# Patient Record
Sex: FEMALE | Race: WHITE | NOT HISPANIC OR LATINO | Employment: FULL TIME | URBAN - METROPOLITAN AREA
[De-identification: names, ages, dates, MRNs, and addresses within clinical notes are randomized per-mention and may not be internally consistent; named-entity substitution may affect disease eponyms.]

---

## 2023-03-02 LAB
ABO GROUP (TYPE) IN BLOOD: NORMAL
ANTIBODY SCREEN: NORMAL
CHLAMYDIA TRACH., AMPLIFIED: NEGATIVE
ERYTHROCYTE DISTRIBUTION WIDTH (RATIO) BY AUTOMATED COUNT: 16.6 % (ref 11.5–14.5)
ERYTHROCYTE MEAN CORPUSCULAR HEMOGLOBIN CONCENTRATION (G/DL) BY AUTOMATED: 29.5 G/DL (ref 32–36)
ERYTHROCYTE MEAN CORPUSCULAR VOLUME (FL) BY AUTOMATED COUNT: 91 FL (ref 80–100)
ERYTHROCYTES (10*6/UL) IN BLOOD BY AUTOMATED COUNT: 4.26 X10E12/L (ref 4–5.2)
ESTIMATED AVERAGE GLUCOSE FOR HBA1C: 94 MG/DL
HEMATOCRIT (%) IN BLOOD BY AUTOMATED COUNT: 38.7 % (ref 36–46)
HEMOGLOBIN (G/DL) IN BLOOD: 11.4 G/DL (ref 12–16)
HEMOGLOBIN A1C/HEMOGLOBIN TOTAL IN BLOOD: 4.9 %
HEPATITIS B VIRUS SURFACE AG PRESENCE IN SERUM: NONREACTIVE
HIV 1/ 2 AG/AB SCREEN: NONREACTIVE
LEUKOCYTES (10*3/UL) IN BLOOD BY AUTOMATED COUNT: 7 X10E9/L (ref 4.4–11.3)
N. GONORRHEA, AMPLIFIED: NEGATIVE
NRBC (PER 100 WBCS) BY AUTOMATED COUNT: 0 /100 WBC (ref 0–0)
PLATELETS (10*3/UL) IN BLOOD AUTOMATED COUNT: 237 X10E9/L (ref 150–450)
REFLEX ADDED, ANEMIA PANEL: ABNORMAL
RH FACTOR: NORMAL
RUBELLA VIRUS IGG AB: POSITIVE
SYPHILIS TOTAL AB: NONREACTIVE
URINE CULTURE: NORMAL

## 2023-06-19 LAB
ERYTHROCYTE DISTRIBUTION WIDTH (RATIO) BY AUTOMATED COUNT: 16.5 % (ref 11.5–14.5)
ERYTHROCYTE MEAN CORPUSCULAR HEMOGLOBIN CONCENTRATION (G/DL) BY AUTOMATED: 30 G/DL (ref 32–36)
ERYTHROCYTE MEAN CORPUSCULAR VOLUME (FL) BY AUTOMATED COUNT: 93 FL (ref 80–100)
ERYTHROCYTES (10*6/UL) IN BLOOD BY AUTOMATED COUNT: 3.24 X10E12/L (ref 4–5.2)
GLUCOSE, 1 HR SCREEN, PREG: 117 MG/DL
HEMATOCRIT (%) IN BLOOD BY AUTOMATED COUNT: 30 % (ref 36–46)
HEMOGLOBIN (G/DL) IN BLOOD: 9 G/DL (ref 12–16)
LEUKOCYTES (10*3/UL) IN BLOOD BY AUTOMATED COUNT: 6.3 X10E9/L (ref 4.4–11.3)
PLATELETS (10*3/UL) IN BLOOD AUTOMATED COUNT: 176 X10E9/L (ref 150–450)
REFLEX ADDED, ANEMIA PANEL: ABNORMAL

## 2023-06-20 LAB
FERRITIN, PREGNANCY: 16 UG/L
FOLATE, SERUM, PREGNANCY: 7.7 NG/ML
IRON (UG/DL) IN SER/PLAS IN PREGNANCY: 40 UG/DL
IRON BINDING CAPACITY (UG/DL) IN PREGNANCY: >490 UG/DL
IRON SATURATION (%) IN PREGNANCY: ABNORMAL %
VITAMIN B12, PREGNANCY: 262 PG/ML

## 2023-07-09 LAB — SARS-COV-2 RESULT: NOT DETECTED

## 2023-08-25 LAB
ERYTHROCYTE DISTRIBUTION WIDTH (RATIO) BY AUTOMATED COUNT: 19.5 % (ref 11.5–14.5)
ERYTHROCYTE MEAN CORPUSCULAR HEMOGLOBIN CONCENTRATION (G/DL) BY AUTOMATED: 30.9 G/DL (ref 32–36)
ERYTHROCYTE MEAN CORPUSCULAR VOLUME (FL) BY AUTOMATED COUNT: 94 FL (ref 80–100)
ERYTHROCYTES (10*6/UL) IN BLOOD BY AUTOMATED COUNT: 3.18 X10E12/L (ref 4–5.2)
FERRITIN (UG/LL) IN SER/PLAS: 20 UG/L (ref 8–150)
FERRITIN, PREGNANCY: 20 UG/L
HEMATOCRIT (%) IN BLOOD BY AUTOMATED COUNT: 29.8 % (ref 36–46)
HEMOGLOBIN (G/DL) IN BLOOD: 9.2 G/DL (ref 12–16)
HEMOGLOBIN (PG) IN RETICULOCYTES: 29 PG (ref 28–38)
IRON (UG/DL) IN SER/PLAS IN PREGNANCY: 62 UG/DL
IRON BINDING CAPACITY (UG/DL) IN PREGNANCY: >512 UG/DL
IRON SATURATION (%) IN PREGNANCY: ABNORMAL %
LEUKOCYTES (10*3/UL) IN BLOOD BY AUTOMATED COUNT: 5.5 X10E9/L (ref 4.4–11.3)
NRBC (PER 100 WBCS) BY AUTOMATED COUNT: 0.4 /100 WBC (ref 0–0)
PLATELETS (10*3/UL) IN BLOOD AUTOMATED COUNT: 167 X10E9/L (ref 150–450)
REFLEX ADDED, ANEMIA PANEL: ABNORMAL
RETICULOCYTES (10*3/UL) IN BLOOD: 0.11 X10E12/L (ref 0.02–0.08)
RETICULOCYTES/100 ERYTHROCYTES IN BLOOD BY AUTOMATED COUNT: 3.5 % (ref 0.5–2)

## 2023-08-26 LAB
FOLATE, SERUM, PREGNANCY: 11.3 NG/ML
VITAMIN B12, PREGNANCY: 147 PG/ML

## 2023-09-19 RX ORDER — FERROUS SULFATE TAB 325 MG (65 MG ELEMENTAL FE) 325 (65 FE) MG
1 TAB ORAL 2 TIMES DAILY
COMMUNITY

## 2023-09-19 RX ORDER — SERTRALINE HYDROCHLORIDE 50 MG/1
50 TABLET, FILM COATED ORAL DAILY
COMMUNITY

## 2023-09-19 RX ORDER — VITAMIN A ACETATE, BETA CAROTENE, ASCORBIC ACID, CHOLECALCIFEROL, .ALPHA.-TOCOPHEROL ACETATE, DL-, THIAMINE MONONITRATE, RIBOFLAVIN, NIACINAMIDE, PYRIDOXINE HYDROCHLORIDE, FOLIC ACID, CYANOCOBALAMIN, CALCIUM CARBONATE, FERROUS FUMARATE, ZINC OXIDE, CUPRIC OXIDE 3080; 12; 120; 400; 1; 1.84; 3; 20; 22; 920; 25; 200; 27; 10; 2 [IU]/1; UG/1; MG/1; [IU]/1; MG/1; MG/1; MG/1; MG/1; MG/1; [IU]/1; MG/1; MG/1; MG/1; MG/1; MG/1
TABLET, FILM COATED ORAL
COMMUNITY

## 2023-10-09 RX ORDER — NIFEDIPINE 60 MG/1
60 TABLET, EXTENDED RELEASE ORAL DAILY
Qty: 20 TABLET | OUTPATIENT
Start: 2023-10-09

## 2023-10-23 ENCOUNTER — APPOINTMENT (OUTPATIENT)
Dept: OBSTETRICS AND GYNECOLOGY | Facility: CLINIC | Age: 33
End: 2023-10-23
Payer: COMMERCIAL

## 2023-10-26 ENCOUNTER — POSTPARTUM VISIT (OUTPATIENT)
Dept: OBSTETRICS AND GYNECOLOGY | Facility: CLINIC | Age: 33
End: 2023-10-26
Payer: COMMERCIAL

## 2023-10-26 VITALS — DIASTOLIC BLOOD PRESSURE: 84 MMHG | SYSTOLIC BLOOD PRESSURE: 126 MMHG | BODY MASS INDEX: 36.11 KG/M2 | WEIGHT: 217 LBS

## 2023-10-26 PROCEDURE — 0503F POSTPARTUM CARE VISIT: CPT

## 2023-10-26 ASSESSMENT — EDINBURGH POSTNATAL DEPRESSION SCALE (EPDS)
THE THOUGHT OF HARMING MYSELF HAS OCCURRED TO ME: NEVER
I HAVE FELT SAD OR MISERABLE: NO, NOT AT ALL
I HAVE BEEN SO UNHAPPY THAT I HAVE HAD DIFFICULTY SLEEPING: NOT AT ALL
I HAVE LOOKED FORWARD WITH ENJOYMENT TO THINGS: AS MUCH AS I EVER DID
TOTAL SCORE: 1
I HAVE BLAMED MYSELF UNNECESSARILY WHEN THINGS WENT WRONG: NOT VERY OFTEN
I HAVE BEEN ANXIOUS OR WORRIED FOR NO GOOD REASON: NO, NOT AT ALL
THINGS HAVE BEEN GETTING ON TOP OF ME: NO, I HAVE BEEN COPING AS WELL AS EVER
I HAVE BEEN ABLE TO LAUGH AND SEE THE FUNNY SIDE OF THINGS: AS MUCH AS I ALWAYS COULD
I HAVE BEEN SO UNHAPPY THAT I HAVE BEEN CRYING: NO, NEVER
I HAVE FELT SCARED OR PANICKY FOR NO GOOD REASON: NO, NOT AT ALL

## 2023-10-26 NOTE — PROGRESS NOTES
IMPRESSIONS:  33 y.o. yo  here for - -     Postpartum examination following CS, normal recovery course  - Returning to exercise and sex discussed. Patient is cleared. Strengthening pelvic floor with Kegels encouraged before high impact exercise to prevent weakening reviewed.   - Discussed using lubrication with sexual intercourse as estrogen levels are lower r/t breastfeeding and vaginal mucosa may be dry because of this. Patient verbalizes understanding.   - Encouraged patient to continue to monitor for signs or symptoms of  mood and anxiety disorders  - Routine follow up with PCP for health maintenance examination encouraged   - Encouraged continued breastfeeding. Patient aware resources are available should she need them.   - Warning s/s discussed. All questions answered.    Contraception  - Postpartum contraception discussed - FOB has already had a vasectomy    Cervical cancer screening  - PAP up to date  - Reviewed ASCCP guidelines with patient; next PAP due 3/2028    F/U one year for annual exam, or sooner as needed    BOBBY [facility]-NER[facility]    Subjective     33 y.o. yo  presents for her 6 week postpartum follow up. She is s/p repeat CS on 23.     Pregnancy c/b: GHTN, Anemia  Delivery notable for: uncomplicated, was discharged on Procardia which was discontinued at 1 week postpartum    PP Recovery:   Feeding - exclusively pumping  Incisional Discomfort - denies  Depression - Denies s/s depression.  See PP depression screen.  Emotional Support - yes  Bowel Symptoms - Negative for abdominal discomfort, blood in stool or black stool, and negative change in bowel habits.  Bladder Symptoms - No dysuria, denies hematuria, urinary frequency, urinary urgency or incontinence.   Lochia resolved.  Menses Since Delivery - Not resumed   Menstrual Pattern Prior to Pregnancy - Monthly.   Slick since Delivery - No    Contraception Plan - partner vasectomy    Pap Hx: last PAP 3/1/23 nml,  HPV neg    Objective      Visit Vitals  /84   Wt 98.4 kg (217 lb)   LMP 2022   BMI 36.11 kg/m²   OB Status Pregnant   Smoking Status Never   BSA 2.12 m²      Physical Exam  Vitals reviewed.   Constitutional:       General: She is not in acute distress.     Appearance: Normal appearance.   HENT:      Head: Normocephalic and atraumatic.   Pulmonary:      Effort: Pulmonary effort is normal.   Abdominal:      General: Abdomen is flat.      Palpations: Abdomen is soft.      Tenderness: There is no abdominal tenderness.      Comments: no diastasis recti; low transverse  scar appears fully healed, well-approximated, without edema, erythema, or drainage   Genitourinary:     Comments: Pt declines pelvic exam  Musculoskeletal:         General: Normal range of motion.      Cervical back: Normal range of motion.   Skin:     General: Skin is warm and dry.   Neurological:      Mental Status: She is alert and oriented to person, place, and time.   Psychiatric:         Mood and Affect: Mood normal.         Behavior: Behavior normal.         Thought Content: Thought content normal.         Judgment: Judgment normal.

## 2025-06-04 ENCOUNTER — TELEMEDICINE (OUTPATIENT)
Dept: URGENT CARE | Age: 35
End: 2025-06-04
Payer: COMMERCIAL

## 2025-06-04 ENCOUNTER — APPOINTMENT (OUTPATIENT)
Dept: URGENT CARE | Age: 35
End: 2025-06-04
Payer: COMMERCIAL

## 2025-06-04 DIAGNOSIS — B08.4 HAND, FOOT AND MOUTH DISEASE: Primary | ICD-10-CM

## 2025-06-04 NOTE — LETTER
June 4, 2025    Patient: Tiara Baer   YOB: 1990   Date of Visit: 6/4/2025       To Whom It May Concern:    Tiara Baer was seen and treated virtually.  Please excuse her from work tonight, 6/4              CC: No Recipients

## 2025-06-04 NOTE — PROGRESS NOTES
Urgent Care Virtual Video Visit    Patient Location: home  Provider Location: [facility]  Patient states that she started with hand-foot-and-mouth disease on Saturday.  She started with fevers first.  Monday she started with spots on her palms and soles.  Her children did have hand-foot-and-mouth disease.  She has not had a fever since Monday morning.  She is due to work tonight.  She is eating and drinking.    She was given a work note for tonight, 6/4.  Advised Tylenol ibuprofen for pain.  May return to work this weekend assuming she continues to be afebrile and has no open sores.    I have communicated my name and active licensure. Video visit completed with realtime synchronous video/audio connection. Informed consent was obtained from the patient. Patient was made aware that my evaluation and diagnosis are limited due to the fact that we are not in the same room during the interview and that this is a virtual encounter that took place via videoconferencing. Patient verbalized understanding.     Patient disposition: Home    Electronically signed by Loc Dupree PA-C  10:27 AM

## 2025-06-04 NOTE — PATIENT INSTRUCTIONS
"Hand, foot, and mouth disease     The Basics  Written by the doctors and editors at [facility]    What should I know?  You have small bumps or sores in their mouth, on their hands and feet, and maybe on their buttocks or genitals. The sores can hurt. You may have a fever.  The bumps or sores are caused by a viral infection called \"hand, foot, and mouth disease.\" The infection spreads easily from person to person. It most often affects young children, but older children and adults can get it, too. It usually goes away on its own without treatment in a week or so.  How do I care for myself at home?    ? Cold foods, like popsicles or ice cream, can help ease mouth pain. Soft foods, like pudding or gelatin  ? May return to work when you don't have a fever or open sores.    ? Take non-prescription medicines to relieve pain, such as acetaminophen (sample brand name: Tylenol) or ibuprofen (sample brand names: Advil, Motrin).   ? You may rinse their mouth with salt water. This might help with pain. To do this, mix 1/4 to 1/2 teaspoon of salt into 8 ounces of warm water.  swish the water in their mouth, then spit it out. Do not swallow it. You can do this 2 or 3 times a day.    "